# Patient Record
Sex: FEMALE | Race: BLACK OR AFRICAN AMERICAN | NOT HISPANIC OR LATINO | Employment: FULL TIME | URBAN - METROPOLITAN AREA
[De-identification: names, ages, dates, MRNs, and addresses within clinical notes are randomized per-mention and may not be internally consistent; named-entity substitution may affect disease eponyms.]

---

## 2017-03-02 ENCOUNTER — LAB VISIT (OUTPATIENT)
Dept: LAB | Facility: HOSPITAL | Age: 42
End: 2017-03-02
Attending: NURSE PRACTITIONER
Payer: COMMERCIAL

## 2017-03-02 ENCOUNTER — OFFICE VISIT (OUTPATIENT)
Dept: OBSTETRICS AND GYNECOLOGY | Facility: CLINIC | Age: 42
End: 2017-03-02
Payer: COMMERCIAL

## 2017-03-02 VITALS
BODY MASS INDEX: 33.25 KG/M2 | WEIGHT: 187.63 LBS | SYSTOLIC BLOOD PRESSURE: 122 MMHG | DIASTOLIC BLOOD PRESSURE: 76 MMHG | HEIGHT: 63 IN

## 2017-03-02 DIAGNOSIS — N91.2 AMENORRHEA: ICD-10-CM

## 2017-03-02 DIAGNOSIS — R23.2 HOT FLASHES: ICD-10-CM

## 2017-03-02 DIAGNOSIS — N97.0 ANOVULATION: ICD-10-CM

## 2017-03-02 DIAGNOSIS — Z01.419 ENCOUNTER FOR GYNECOLOGICAL EXAMINATION WITHOUT ABNORMAL FINDING: Primary | ICD-10-CM

## 2017-03-02 DIAGNOSIS — Z12.31 ENCOUNTER FOR SCREENING MAMMOGRAM FOR BREAST CANCER: ICD-10-CM

## 2017-03-02 LAB
FSH SERPL-ACNC: 64.5 MIU/ML
TSH SERPL DL<=0.005 MIU/L-ACNC: 0.45 UIU/ML

## 2017-03-02 PROCEDURE — 36415 COLL VENOUS BLD VENIPUNCTURE: CPT | Mod: PO

## 2017-03-02 PROCEDURE — 99386 PREV VISIT NEW AGE 40-64: CPT | Mod: 25,S$GLB,, | Performed by: NURSE PRACTITIONER

## 2017-03-02 PROCEDURE — 84443 ASSAY THYROID STIM HORMONE: CPT

## 2017-03-02 PROCEDURE — 84270 ASSAY OF SEX HORMONE GLOBUL: CPT

## 2017-03-02 PROCEDURE — 83001 ASSAY OF GONADOTROPIN (FSH): CPT

## 2017-03-02 PROCEDURE — 99999 PR PBB SHADOW E&M-EST. PATIENT-LVL II: CPT | Mod: PBBFAC,,, | Performed by: NURSE PRACTITIONER

## 2017-03-02 PROCEDURE — 81025 URINE PREGNANCY TEST: CPT | Mod: S$GLB,,, | Performed by: NURSE PRACTITIONER

## 2017-03-02 PROCEDURE — 88175 CYTOPATH C/V AUTO FLUID REDO: CPT

## 2017-03-02 NOTE — MR AVS SNAPSHOT
[facility] - OB/ GYN  [address] [facility] [place] [address]  Phone: [phone]  Fax: [phone]                  Yasmany Meek   3/2/2017 10:30 AM   Office Visit    Description:  Female : 1975   Provider:  Brie Sánchez NP   Department:  [facility] - OB/ GYN           Reason for Visit     Hot Flashes     Annual Exam           Diagnoses this Visit        Comments    Encounter for gynecological examination without abnormal finding    -  Primary     Anovulation         Amenorrhea         Encounter for screening mammogram for breast cancer         Hot flashes                To Do List           Future Appointments        Provider Department Dept Phone    3/2/2017 12:30 PM LABORATORY, [facility] [phone]    3/7/2017 3:15 PM [facility] [id number] [facility] [phone]      Goals (5 Years of Data)     None      Follow-Up and Disposition     Return for after labs.    Follow-up and Disposition History      [facility]     [facility] -  Assistance  Registered nurses in the [facility] provide clinical advisement, health education, appointment booking, and other advisory services.  Call for this free service at [phone].             Medications           Message regarding Medications     Verify the changes and/or additions to your medication regime listed below are the same as discussed with your clinician today.  If any of these changes or additions are incorrect, please notify your healthcare provider.        STOP taking these medications     emtricitabine-tenofovir 200-300 mg (TRUVADA) 200-300 mg Tab Take 1 tablet by mouth once daily.    raltegravir (ISENTRESS) 400 mg tablet Take 1 tablet (400 mg total) by mouth 2 (two) times daily.           Verify that the below list of medications is an accurate representation of the medications you are currently taking.  If none reported, the list may be blank. If incorrect,  please contact your healthcare provider. Carry this list with you in case of emergency.           Current Medications            Clinical Reference Information           Your Vitals Were     BP                   122/76           Blood Pressure          Most Recent Value    BP  122/76      Allergies as of 3/2/2017     No Known Allergies      Immunizations Administered on Date of Encounter - 3/2/2017     None      Orders Placed During Today's Visit      Normal Orders This Visit    Liquid-based pap smear, screening     POCT urine pregnancy     Future Labs/Procedures Expected by Expires    Follicle stimulating hormone  3/2/2017 5/1/2018    Mammo Digital Screening Bilat with CAD  3/2/2017 5/2/2018    Testosterone Panel  3/2/2017 5/1/2018    TSH  3/2/2017 5/1/2018      [facility] Sign-Up     Activating your [facility] account is as easy as 1-2-3!     1) Visit [email or web address], select Sign Up Now, enter this activation code and your date of birth, then select Next.  [email or web address]  Expires: 4/16/2017 11:24 AM      2) Create a username and password to use when you visit [facility] in the future and select a security question in case you lose your password and select Next.    3) Enter your e-mail address and click Sign Up!    Additional Information  If you have questions, please e-mail [email or web address] or call [phone] to talk to our [facility] staff. Remember, [facility] is NOT to be used for urgent needs. For medical emergencies, dial 911.         Language Assistance Services     ATTENTION: Language assistance services are available, free of charge. Please call [phone].      ATENCIÓN: Si habla español, tiene a rodgers disposición servicios gratuitos de asistencia lingüística. Llame al [phone].     CHÚ Ý: N?u b?n nói Ti?ng Vi?t, có các d?ch v? h? tr? ngôn ng? mi?n phí dành cho b?n. G?i s? [phone].         [facility] - [facility] complies with applicable Federal civil rights laws and does not discriminate on the  basis of race, color, national origin, age, disability, or sex.
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
## 2017-03-06 ENCOUNTER — TELEPHONE (OUTPATIENT)
Dept: OBSTETRICS AND GYNECOLOGY | Facility: CLINIC | Age: 42
End: 2017-03-06

## 2017-03-06 NOTE — TELEPHONE ENCOUNTER
----- Message from Angie Milan MA sent at 3/6/2017  9:31 AM CST -----  Contact: [phone]      ----- Message -----     From: Trevor Lewis     Sent: 3/6/2017   8:18 AM       To: [facility] Staff    Pt would like to consult with the nurse about Hot flashes.  Pt would like a Rx called in to [facility] pharmacy for hot flashes.  Please call back @ [phone].  Thanks-[facility]             Pt uses:  .  [facility] [address] [address] [address]  Phone: [phone] Fax: [phone]

## 2017-03-07 ENCOUNTER — HOSPITAL ENCOUNTER (OUTPATIENT)
Dept: RADIOLOGY | Facility: HOSPITAL | Age: 42
Discharge: HOME | End: 2017-03-07
Attending: NURSE PRACTITIONER
Payer: COMMERCIAL

## 2017-03-07 DIAGNOSIS — Z12.31 ENCOUNTER FOR SCREENING MAMMOGRAM FOR BREAST CANCER: ICD-10-CM

## 2017-03-07 PROCEDURE — 77067 SCR MAMMO BI INCL CAD: CPT | Mod: 26,,, | Performed by: RADIOLOGY

## 2017-03-07 PROCEDURE — 77067 SCR MAMMO BI INCL CAD: CPT | Mod: TC

## 2017-03-08 LAB
ALBUMIN SERPL-MCNC: 3.8 G/DL (ref 3.6–5.1)
SHBG SERPL-SCNC: 51 NMOL/L (ref 17–124)
TESTOST FREE SERPL-MCNC: 1.6 PG/ML (ref 0.2–5)
TESTOST SERPL-MCNC: 19 NG/DL (ref 2–45)
TESTOSTERONE.FREE+WB SERPL-MCNC: 2.8 NG/DL (ref 0.5–8.5)

## 2017-03-08 NOTE — TELEPHONE ENCOUNTER
Pt was informed per Brie hormone results were not back yet and she cant call any Rx in until she has the results, pt voiced understanding and was told someone will call her when results come in.

## 2017-03-08 NOTE — TELEPHONE ENCOUNTER
Still awaiting some of her hormone levels - checked lab and it was a send out will probably be next week when I get them - so until then can not give her anything.

## 2017-03-14 DIAGNOSIS — N91.2 AMENORRHEA: Primary | ICD-10-CM

## 2017-03-14 DIAGNOSIS — R23.2 HOT FLASHES: ICD-10-CM

## 2020-07-29 ENCOUNTER — HOSPITAL ENCOUNTER (OUTPATIENT)
Dept: RADIOLOGY | Facility: HOSPITAL | Age: 45
Discharge: HOME | End: 2020-07-29
Attending: PODIATRIST
Payer: COMMERCIAL

## 2020-07-29 ENCOUNTER — OFFICE VISIT (OUTPATIENT)
Dept: PODIATRY | Facility: CLINIC | Age: 45
End: 2020-07-29
Payer: COMMERCIAL

## 2020-07-29 VITALS
HEART RATE: 68 BPM | SYSTOLIC BLOOD PRESSURE: 120 MMHG | BODY MASS INDEX: 33.09 KG/M2 | WEIGHT: 186.75 LBS | DIASTOLIC BLOOD PRESSURE: 79 MMHG | HEIGHT: 63 IN

## 2020-07-29 DIAGNOSIS — M79.672 LEFT FOOT PAIN: ICD-10-CM

## 2020-07-29 DIAGNOSIS — M77.50 TENDONITIS OF FOOT: Primary | ICD-10-CM

## 2020-07-29 PROCEDURE — 3008F BODY MASS INDEX DOCD: CPT | Mod: CPTII,S$GLB,, | Performed by: PODIATRIST

## 2020-07-29 PROCEDURE — 73630 X-RAY EXAM OF FOOT: CPT | Mod: TC,LT

## 2020-07-29 PROCEDURE — 73630 XR FOOT COMPLETE 3 VIEW LEFT: ICD-10-PCS | Mod: 26,LT,, | Performed by: RADIOLOGY

## 2020-07-29 PROCEDURE — 99999 PR PBB SHADOW E&M-NEW PATIENT-LVL III: ICD-10-PCS | Mod: PBBFAC,,, | Performed by: PODIATRIST

## 2020-07-29 PROCEDURE — 99999 PR PBB SHADOW E&M-NEW PATIENT-LVL III: CPT | Mod: PBBFAC,,, | Performed by: PODIATRIST

## 2020-07-29 PROCEDURE — 99203 OFFICE O/P NEW LOW 30 MIN: CPT | Mod: S$GLB,,, | Performed by: PODIATRIST

## 2020-07-29 PROCEDURE — 73630 X-RAY EXAM OF FOOT: CPT | Mod: 26,LT,, | Performed by: RADIOLOGY

## 2020-07-29 PROCEDURE — 99203 PR OFFICE/OUTPT VISIT, NEW, LEVL III, 30-44 MIN: ICD-10-PCS | Mod: S$GLB,,, | Performed by: PODIATRIST

## 2020-07-29 PROCEDURE — 3008F PR BODY MASS INDEX (BMI) DOCUMENTED: ICD-10-PCS | Mod: CPTII,S$GLB,, | Performed by: PODIATRIST

## 2020-07-29 NOTE — PROGRESS NOTES
Subjective:     Patient ID: Yasmany Meek is a 44 y.o. female.    Chief Complaint: Foot Pain (c/o throbbing pains to left lateral foot. rates pain 9/10. while walking. wears tennis shoes with socks. non-diabetic pt. PCP Dr. Bashir, last seen on 17.)    Yasmany is a 44 y.o. female who presents to the podiatry clinic  with complaint of  left foot pain. Onset of the symptoms was about a month ago. Precipitating event: none known. Current symptoms include: ability to bear weight, but with some pain. Aggravating factors: walking. Symptoms have been intermittent. Patient has had no prior foot problems. Evaluation to date: none. Treatment to date: none. Patients rates pain 9/10 on pain scale when present. Patient states she has been wearing rainboots more at work. Patient has no other pedal complaints at this time.     There is no problem list on file for this patient.      Medication List with Changes/Refills   Current Medications    ESTROGEN, CONJUGATED,-MEDROXYPROGESTERONE 0.625-2.5MG (PREMPRO) 0.625-2.5 MG PER TABLET    Take 1 tablet by mouth once daily.       Review of patient's allergies indicates:  No Known Allergies    Past Surgical History:   Procedure Laterality Date    EYE SURGERY      Left eye    LEG SURGERY      Left leg       Family History   Problem Relation Age of Onset    Diabetes Mother     Hypertension Mother     Breast cancer Neg Hx     Cancer Neg Hx     Colon cancer Neg Hx     Eclampsia Neg Hx     Miscarriages / Stillbirths Neg Hx     Ovarian cancer Neg Hx      labor Neg Hx     Stroke Neg Hx        Social History     Socioeconomic History    Marital status: Single     Spouse name: Not on file    Number of children: Not on file    Years of education: Not on file    Highest education level: Not on file   Occupational History    Not on file   Social Needs    Financial resource strain: Not on file    Food insecurity     Worry: Not on file     Inability: Not on file     "Transportation needs     Medical: Not on file     Non-medical: Not on file   Tobacco Use    Smoking status: Never Smoker   Substance and Sexual Activity    Alcohol use: No    Drug use: No    Sexual activity: Never   Lifestyle    Physical activity     Days per week: Not on file     Minutes per session: Not on file    Stress: Not on file   Relationships    Social connections     Talks on phone: Not on file     Gets together: Not on file     Attends [employer] service: Not on file     Active member of club or organization: Not on file     Attends meetings of clubs or organizations: Not on file     Relationship status: Not on file   Other Topics Concern    Not on file   Social History Narrative    Not on file       Vitals:    07/29/20 1349   BP: 120/79   Pulse: 68   Weight: 84.7 kg (186 lb 11.7 oz)   Height: 5' 3" (1.6 m)   PainSc:   9   PainLoc: Foot       Review of Systems   Constitutional: Negative for chills and fever.   Respiratory: Negative for shortness of breath.    Cardiovascular: Negative for chest pain, palpitations, orthopnea, claudication and leg swelling.   Gastrointestinal: Negative for diarrhea, nausea and vomiting.   Musculoskeletal: Negative for joint pain.   Skin: Negative for rash.   Neurological: Negative for dizziness, tingling, sensory change, focal weakness and weakness.   Psychiatric/Behavioral: Negative.            Objective:       PHYSICAL EXAM: Apperance: Alert and orient in no distress,well developed, and with good attention to grooming and body habits  Patient presents ambulating in tennis shoes.   Lower Extremity Physical Exam:  VASCULAR: Dorsalis pedis pulses 2/4 bilateral and Posterior Tibial pulses 2/4 bilateral. Capillary fill time <3 seconds bilateral. No edema observed bilateral. Varicosities absent bilateral. Skin temperature of the lower extremities is warm to warm, proximal to distal. Hair growth WNL bilateral.  DERMATOLOGICAL: No skin rashes, subcutaneous nodules, " lesions, or ulcers observed bilateral.  NEUROLOGICAL: Light touch, sharp-dull, proprioception all present and equal bilaterally.    MUSCULOSKELETAL: Muscle strength is 5/5 for foot inverters, everters, plantarflexors, and dorsiflexors. Muscle tone is normal. (+) pain on palpation of left lateral midfoot. No pain on inversion or eversion of left foot. Pes planus noted bilateral.     TEST RESULTS: Radiographs of left foot/ankle taken reveals mild pes planus appearance.  No fracture or dislocation or osseous erosion.  Degenerative changes at the 1st MTP joint          Assessment:       Encounter Diagnoses   Name Primary?    Tendonitis of foot - Left Foot Yes    Left foot pain          Plan:   Tendonitis of foot - Left Foot    Left foot pain  -     X-Ray Foot Complete Left; Future; Expected date: 07/29/2020      I counseled the patient on her conditions, regarding findings of my examination, my impressions, and usual treatment plan.   Ordered and reviewed left foot x-rays with patient in exam room.   Patient instructed on adequate icing techniques. Patient should ice the affected area at least once per day x 10 minutes for 10 days . I advised the  patient that extra icing would also be beneficial to ensure adequate anti inflammatory effect.   Patient was fitted with surgical shoe and instructed on proper usage. This should be worn daily for a minimum of 2 weeks at all times when ambulating. Patient instructed not to drive with walking boot if on right foot.   The patient and I reviewed the types of shoes she should be wearing, my recommendation includes generally the best time of the day for a shoe fitting is the afternoon, shoes with a wide toe box, very good cushion, and tennis shoes with removable inner soles.The patient and I reviewed my recommendations for over-the-counter orthotic inserts.   Patient to return if symptoms persist or worsen.                Zeeshan Barton DPM  [facility]